# Patient Record
Sex: FEMALE | Race: BLACK OR AFRICAN AMERICAN | NOT HISPANIC OR LATINO | Employment: OTHER | URBAN - METROPOLITAN AREA
[De-identification: names, ages, dates, MRNs, and addresses within clinical notes are randomized per-mention and may not be internally consistent; named-entity substitution may affect disease eponyms.]

---

## 2017-12-06 PROBLEM — F41.9 ANXIETY: Status: ACTIVE | Noted: 2017-12-06

## 2017-12-06 PROBLEM — F32.A DEPRESSION: Status: ACTIVE | Noted: 2017-12-06

## 2017-12-06 PROBLEM — M54.41 CHRONIC BILATERAL LOW BACK PAIN WITH BILATERAL SCIATICA: Status: ACTIVE | Noted: 2017-12-06

## 2017-12-06 PROBLEM — G89.29 CHRONIC BILATERAL LOW BACK PAIN WITH BILATERAL SCIATICA: Status: ACTIVE | Noted: 2017-12-06

## 2017-12-06 PROBLEM — M54.42 CHRONIC BILATERAL LOW BACK PAIN WITH BILATERAL SCIATICA: Status: ACTIVE | Noted: 2017-12-06

## 2017-12-06 PROBLEM — I10 HYPERTENSION: Status: ACTIVE | Noted: 2017-12-06

## 2017-12-06 PROBLEM — F31.9 BIPOLAR DISORDER: Status: ACTIVE | Noted: 2017-12-06

## 2018-05-02 RX ORDER — PAROXETINE HYDROCHLORIDE 20 MG/1
TABLET, FILM COATED ORAL
COMMUNITY
Start: 2018-04-22

## 2018-05-02 RX ORDER — HYDROCHLOROTHIAZIDE 25 MG/1
25 TABLET ORAL DAILY
COMMUNITY
Start: 2018-05-01

## 2018-05-02 RX ORDER — ATORVASTATIN CALCIUM 40 MG/1
40 TABLET, FILM COATED ORAL DAILY
COMMUNITY
Start: 2018-02-27

## 2018-05-02 RX ORDER — QUETIAPINE FUMARATE 200 MG/1
200 TABLET, FILM COATED ORAL NIGHTLY
COMMUNITY
Start: 2018-04-28

## 2018-05-02 RX ORDER — AMLODIPINE BESYLATE 10 MG/1
TABLET ORAL
Status: ON HOLD | COMMUNITY
Start: 2018-03-26 | End: 2023-02-25 | Stop reason: HOSPADM

## 2018-05-02 RX ORDER — VERAPAMIL HYDROCHLORIDE 240 MG/1
240 CAPSULE, EXTENDED RELEASE ORAL DAILY
COMMUNITY
Start: 2018-05-01

## 2018-05-02 RX ORDER — ERGOCALCIFEROL 1.25 MG/1
CAPSULE ORAL
COMMUNITY
Start: 2018-05-01

## 2018-05-02 RX ORDER — CLONAZEPAM 1 MG/1
2 TABLET ORAL 2 TIMES DAILY PRN
COMMUNITY
Start: 2018-05-01

## 2018-05-02 RX ORDER — HYDROCODONE BITARTRATE AND ACETAMINOPHEN 10; 325 MG/1; MG/1
TABLET ORAL
COMMUNITY
Start: 2018-04-15 | End: 2018-08-24

## 2018-05-02 RX ORDER — DIVALPROEX SODIUM 500 MG/1
500 TABLET, DELAYED RELEASE ORAL DAILY
COMMUNITY
Start: 2018-05-01

## 2022-04-11 DIAGNOSIS — Z12.31 ENCOUNTER FOR SCREENING MAMMOGRAM FOR MALIGNANT NEOPLASM OF BREAST: Primary | ICD-10-CM

## 2023-02-24 PROBLEM — R29.90 STROKE-LIKE SYMPTOMS: Status: ACTIVE | Noted: 2023-02-24

## 2023-02-25 PROBLEM — R29.90 STROKE-LIKE SYMPTOMS: Status: RESOLVED | Noted: 2023-02-24 | Resolved: 2023-02-25

## 2023-04-07 PROBLEM — M19.90 OSTEOARTHRITIS: Status: ACTIVE | Noted: 2023-04-07

## 2023-06-14 DIAGNOSIS — M25.562 PAIN IN BOTH KNEES, UNSPECIFIED CHRONICITY: Primary | ICD-10-CM

## 2023-06-14 DIAGNOSIS — M25.561 PAIN IN BOTH KNEES, UNSPECIFIED CHRONICITY: Primary | ICD-10-CM

## 2023-06-19 ENCOUNTER — OFFICE VISIT (OUTPATIENT)
Dept: ORTHOPEDICS | Facility: CLINIC | Age: 63
End: 2023-06-19
Payer: MEDICAID

## 2023-06-19 VITALS
RESPIRATION RATE: 18 BRPM | SYSTOLIC BLOOD PRESSURE: 122 MMHG | HEART RATE: 107 BPM | DIASTOLIC BLOOD PRESSURE: 76 MMHG | BODY MASS INDEX: 31.89 KG/M2 | OXYGEN SATURATION: 99 % | HEIGHT: 63 IN

## 2023-06-19 DIAGNOSIS — M17.0 BILATERAL PRIMARY OSTEOARTHRITIS OF KNEE: ICD-10-CM

## 2023-06-19 DIAGNOSIS — G95.9 MYELOPATHY: Primary | ICD-10-CM

## 2023-06-19 PROCEDURE — 3074F PR MOST RECENT SYSTOLIC BLOOD PRESSURE < 130 MM HG: ICD-10-PCS | Mod: CPTII,,,

## 2023-06-19 PROCEDURE — 20610 DRAIN/INJ JOINT/BURSA W/O US: CPT | Mod: PBBFAC,PN

## 2023-06-19 PROCEDURE — 3078F PR MOST RECENT DIASTOLIC BLOOD PRESSURE < 80 MM HG: ICD-10-PCS | Mod: CPTII,,,

## 2023-06-19 PROCEDURE — 3074F SYST BP LT 130 MM HG: CPT | Mod: CPTII,,,

## 2023-06-19 PROCEDURE — 3008F PR BODY MASS INDEX (BMI) DOCUMENTED: ICD-10-PCS | Mod: CPTII,,,

## 2023-06-19 PROCEDURE — 3078F DIAST BP <80 MM HG: CPT | Mod: CPTII,,,

## 2023-06-19 PROCEDURE — 20610 DRAIN/INJ JOINT/BURSA W/O US: CPT | Mod: S$PBB,50,,

## 2023-06-19 PROCEDURE — 20610 LARGE JOINT ASPIRATION/INJECTION: R KNEE: ICD-10-PCS | Mod: S$PBB,50,,

## 2023-06-19 PROCEDURE — 3044F HG A1C LEVEL LT 7.0%: CPT | Mod: CPTII,,,

## 2023-06-19 PROCEDURE — 99205 OFFICE O/P NEW HI 60 MIN: CPT | Mod: 25,S$PBB,,

## 2023-06-19 PROCEDURE — 99213 OFFICE O/P EST LOW 20 MIN: CPT | Mod: PBBFAC,PN,25

## 2023-06-19 PROCEDURE — 99999 PR PBB SHADOW E&M-EST. PATIENT-LVL III: CPT | Mod: PBBFAC,,,

## 2023-06-19 PROCEDURE — 3008F BODY MASS INDEX DOCD: CPT | Mod: CPTII,,,

## 2023-06-19 PROCEDURE — 99999 PR PBB SHADOW E&M-EST. PATIENT-LVL III: ICD-10-PCS | Mod: PBBFAC,,,

## 2023-06-19 PROCEDURE — 99205 PR OFFICE/OUTPT VISIT, NEW, LEVL V, 60-74 MIN: ICD-10-PCS | Mod: 25,S$PBB,,

## 2023-06-19 PROCEDURE — 3044F PR MOST RECENT HEMOGLOBIN A1C LEVEL <7.0%: ICD-10-PCS | Mod: CPTII,,,

## 2023-06-19 RX ORDER — ATORVASTATIN CALCIUM 20 MG/1
20 TABLET, FILM COATED ORAL
COMMUNITY
Start: 2022-09-06

## 2023-06-19 RX ORDER — AMLODIPINE BESYLATE 10 MG/1
1 TABLET ORAL DAILY
COMMUNITY

## 2023-06-19 RX ORDER — VALACYCLOVIR HYDROCHLORIDE 500 MG/1
500 TABLET, FILM COATED ORAL
COMMUNITY
Start: 2023-04-28

## 2023-06-19 RX ORDER — ROSUVASTATIN CALCIUM 20 MG/1
1 TABLET, COATED ORAL DAILY
COMMUNITY

## 2023-06-19 RX ORDER — TRIAMCINOLONE ACETONIDE 40 MG/ML
40 INJECTION, SUSPENSION INTRA-ARTICULAR; INTRAMUSCULAR
Status: COMPLETED | OUTPATIENT
Start: 2023-06-19 | End: 2023-06-19

## 2023-06-19 RX ORDER — CYCLOBENZAPRINE HCL 10 MG
10 TABLET ORAL 2 TIMES DAILY PRN
COMMUNITY
Start: 2023-04-28 | End: 2023-08-31 | Stop reason: SDUPTHER

## 2023-06-19 RX ADMIN — TRIAMCINOLONE ACETONIDE 40 MG: 40 INJECTION, SUSPENSION INTRA-ARTICULAR; INTRAMUSCULAR at 04:06

## 2023-06-19 RX ADMIN — TRIAMCINOLONE ACETONIDE 40 MG: 40 INJECTION, SUSPENSION INTRA-ARTICULAR; INTRAMUSCULAR at 02:06

## 2023-06-19 NOTE — PROCEDURES
Large Joint Aspiration/Injection: L knee    Date/Time: 6/19/2023 2:30 PM  Performed by: Louisa Marks PA-C  Authorized by: Louisa Marks PA-C     Consent Done?:  Yes (Verbal)  Indications:  Pain and arthritis  Site marked: the procedure site was marked    Timeout: prior to procedure the correct patient, procedure, and site was verified    Prep: patient was prepped and draped in usual sterile fashion    Local anesthetic:  Topical anesthetic and bupivacaine 0.25% without epinephrine  Anesthetic total (ml):  4      Details:  Needle Size:  22 G  Ultrasonic Guidance for needle placement?: No    Approach:  Anterolateral  Location:  Knee  Site:  L knee  Medications:  40 mg Triamcinolone acetonide 40 mg/ml (iaCSI)  Patient tolerance:  Patient tolerated the procedure well with no immediate complications   No

## 2023-06-19 NOTE — PROGRESS NOTES
Patient ID: Ivelisse Chacon is a 63 y.o. female    CC: b/l knee pain      History of Present Illness:    Ivelisse Chacon presents to clinic for  bilateral knee pain .  Patient denies known mechanism of injury.  She reports diffuse lower extremity pain.  She does have a history of stroke as well as several spinal surgeries.  She is unable to extend her knees.  They remain in a flexed position.  She is also concerned about some medications that she was supposed to be on in his not quite sure what she is supposed to take.  Her sister as her caregiver is with her today and also expressing concern about lack of communication as well as follow up care regarding patient's disability.  She has been wheelchair-bound for about the last year and a half following the stroke and is concerned she will never be able to walk again.  She did trial formal physical therapy initially however Medicaid stopped approving.  She is unhappy with her primary care as she feels she had as multiple times for an orthopedic referral.  She does follow up with Neurology at [facility].    Occupation: disabled    Ambulating: wheelchair  Diabetic: +   Lab Results   Component Value Date    HGBA1C 6.0 (H) 02/24/2023       Smoking: no  Hx of DVT/PE: no  Was supposed to be on Plavix but unable to determine if she is taking this medicine.    PAST MEDICAL HISTORY:   Past Medical History:   Diagnosis Date    Anxiety     Arthritis     Depressed     History of seasonal allergies     Hyperlipemia     Hypertension      PAST SURGICAL HISTORY:   Past Surgical History:   Procedure Laterality Date    CHOLECYSTECTOMY      HYSTERECTOMY      LUMBAR SPINE SURGERY      TONSILLECTOMY       FAMILY HISTORY:   Family History   Problem Relation Age of Onset    Heart disease Mother     COPD Mother     COPD Father     Stroke Maternal Grandmother      SOCIAL HISTORY:   Social History     Occupational History    Not on file   Tobacco Use    Smoking status: Former     Packs/day: 1.00      Types: Cigarettes    Smokeless tobacco: Not on file   Substance and Sexual Activity    Alcohol use: No    Drug use: Not Currently    Sexual activity: Not Currently        MEDICATIONS:   Current Outpatient Medications:     atorvastatin (LIPITOR) 20 MG tablet, Take 20 mg by mouth., Disp: , Rfl:     busPIRone (BUSPAR) 5 MG Tab, Take 1 tablet (5 mg total) by mouth 3 (three) times daily., Disp: 90 tablet, Rfl: 0    clonazePAM (KLONOPIN) 1 MG tablet, Take 2 mg by mouth 2 (two) times daily as needed for Anxiety., Disp: , Rfl:     divalproex (DEPAKOTE) 500 MG TbEC, Take 500 mg by mouth Daily., Disp: , Rfl:     empagliflozin (JARDIANCE) 10 mg tablet, Take 10 mg by mouth., Disp: , Rfl:     QUEtiapine (SEROQUEL) 200 MG Tab, 200 mg every evening., Disp: , Rfl:     amLODIPine (NORVASC) 10 MG tablet, Take 1 tablet by mouth once daily., Disp: , Rfl:     atorvastatin (LIPITOR) 40 MG tablet, Take 40 mg by mouth once daily., Disp: , Rfl:     clopidogreL (PLAVIX) 75 mg tablet, Take 1 tablet (75 mg total) by mouth once daily. (Patient not taking: Reported on 6/19/2023), Disp: 30 tablet, Rfl: 11    cyclobenzaprine (FLEXERIL) 10 MG tablet, Take 10 mg by mouth 2 (two) times daily as needed., Disp: , Rfl:     diclofenac sodium (VOLTAREN) 1 % Gel, Apply 2 g topically 4 (four) times daily. (Patient not taking: Reported on 6/19/2023), Disp: 100 g, Rfl: 0    etodolac (LODINE) 200 MG Cap, Take 1 capsule (200 mg total) by mouth 3 (three) times daily. (Patient not taking: Reported on 6/19/2023), Disp: 30 capsule, Rfl: 0    hydroCHLOROthiazide (HYDRODIURIL) 25 MG tablet, Take 25 mg by mouth once daily., Disp: , Rfl:     HYDROcodone-acetaminophen (NORCO) 7.5-325 mg per tablet, Take 1 tablet by mouth every 6 (six) hours as needed for Pain. (Patient not taking: Reported on 6/19/2023), Disp: 16 tablet, Rfl: 0    methylPREDNISolone (MEDROL DOSEPACK) 4 mg tablet, use as directed (Patient not taking: Reported on 6/19/2023), Disp: 1 Package, Rfl: 0     paroxetine (PAXIL) 20 MG tablet, , Disp: , Rfl:     rosuvastatin (CRESTOR) 20 MG tablet, Take 1 tablet by mouth once daily., Disp: , Rfl:     valACYclovir (VALTREX) 500 MG tablet, Take 500 mg by mouth., Disp: , Rfl:     verapamil (VERELAN) 240 MG C24P, Take 240 mg by mouth Daily., Disp: , Rfl:     VITAMIN D2 50,000 unit capsule, , Disp: , Rfl:   No current facility-administered medications for this visit.  ALLERGIES:   Review of patient's allergies indicates:   Allergen Reactions    Aspirin     Ibuprofen Other (See Comments)     N/v/d    Tizanidine Other (See Comments)     dizzy         Physical Exam     Vitals:    06/19/23 1436   BP: 122/76   Pulse: 107   Resp: 18     Alert and oriented to person, place and time. No acute distress. Well-groomed, not ill appearing. Pupils round and reactive, normal respiratory effort, no audible wheezing.     GENERAL:  A well-developed, well-nourished 63 y.o. female who is alert and       oriented in no acute distress.  Tearful throughout exam.     Gait: unable to assess                 EXTREMITIES:  Examination of lower extremities reveals there is no visible mass or deformity.    Bilateral knees remain in flexed position.  She is diffusely tender throughout bilateral lower extremities.  There is diffuse numbness.  There is medial and lateral joint line tenderness.  Unable to extend knee passively or actively.  Range of motion 115 to 80.     Imaging:     Bilateral knee X-rays ordered/reviewed by me showing no evidence of fracture or dislocation. There is no obvious malalignment. No evidence of masses, lesions or foreign bodies.  Moderate to severe bilateral primary osteoarthritis, knees remain in flexed position.    Assessment & Plan    Myelopathy  -     Ambulatory referral/consult to Physical/Occupational Therapy; Future; Expected date: 06/26/2023    Bilateral primary osteoarthritis of knee  -     triamcinolone acetonide injection 40 mg  -     triamcinolone acetonide injection 40  mg  -     Large Joint Aspiration/Injection: L knee  -     Large Joint Aspiration/Injection: R knee       I made the decision to obtain old records of the patient including previous notes and imaging. New imaging was ordered today of the extremity or extremities evaluated. I independently reviewed and interpreted the radiographs and/or MRIs/CT scan today as well as prior imaging.    Patient is a poor historian.  There seems to be a lack of understanding about her medical condition.  Patient unsure of what medicines she was supposed to be taking.  Patient's main concern today is if she will ever walk again. Had a long discussion with patient and caregiver that I am unsure if she will ever be able to walk or extend her legs again, encouraged f/u with neurologist. Discussed with stroke and previous spinal surgeries there may be residual effects.  Discussed we may trial bilateral knee CSI to possibly improve knee pain, but will likely not help with extension and function.  CSIs given.  Post-injection instructions reviewed.    Also placed physical therapy referral.  Patient responsible to establish and continue care.    Discussed importance of communicating with primary care provider as well as neurologist regarding patient's outcomes as well as what medicine she was supposed to be taking.    Follow up: prn  X-rays next visit: none    All questions were answered and patient is agreeable to the above plan.     The total time spent for evaluation and management on 06/20/2023 including reviewing separately obtained history, performing a medically appropriate exam and evaluation, documenting clinical information in the health record, independently interpreting results and communicating them to the patient/family/caregiver, and ordering medications/tests/procedures was between 60+ minutes.

## 2023-06-19 NOTE — PROCEDURES
Large Joint Aspiration/Injection: R knee    Date/Time: 6/19/2023 2:30 PM  Performed by: Louisa Marks PA-C  Authorized by: Louisa Marks PA-C     Consent Done?:  Yes (Verbal)  Indications:  Pain and arthritis  Site marked: the procedure site was marked    Timeout: prior to procedure the correct patient, procedure, and site was verified    Prep: patient was prepped and draped in usual sterile fashion    Local anesthetic:  Topical anesthetic and bupivacaine 0.25% without epinephrine  Anesthetic total (ml):  4      Details:  Needle Size:  22 G  Ultrasonic Guidance for needle placement?: No    Approach:  Anterolateral  Location:  Knee  Site:  R knee  Medications:  40 mg Triamcinolone acetonide 40 mg/ml (iaCSI)  Patient tolerance:  Patient tolerated the procedure well with no immediate complications

## 2023-08-31 ENCOUNTER — OFFICE VISIT (OUTPATIENT)
Dept: ORTHOPEDICS | Facility: CLINIC | Age: 63
End: 2023-08-31
Payer: MEDICAID

## 2023-08-31 ENCOUNTER — TELEPHONE (OUTPATIENT)
Dept: ORTHOPEDICS | Facility: CLINIC | Age: 63
End: 2023-08-31
Payer: MEDICAID

## 2023-08-31 VITALS
SYSTOLIC BLOOD PRESSURE: 147 MMHG | HEART RATE: 109 BPM | WEIGHT: 171.94 LBS | HEIGHT: 62 IN | BODY MASS INDEX: 31.64 KG/M2 | DIASTOLIC BLOOD PRESSURE: 83 MMHG

## 2023-08-31 DIAGNOSIS — M17.0 BILATERAL PRIMARY OSTEOARTHRITIS OF KNEE: Primary | ICD-10-CM

## 2023-08-31 PROCEDURE — 3008F BODY MASS INDEX DOCD: CPT | Mod: CPTII,,,

## 2023-08-31 PROCEDURE — 3079F DIAST BP 80-89 MM HG: CPT | Mod: CPTII,,,

## 2023-08-31 PROCEDURE — 3077F SYST BP >= 140 MM HG: CPT | Mod: CPTII,,,

## 2023-08-31 PROCEDURE — 1159F MED LIST DOCD IN RCRD: CPT | Mod: CPTII,,,

## 2023-08-31 PROCEDURE — 3077F PR MOST RECENT SYSTOLIC BLOOD PRESSURE >= 140 MM HG: ICD-10-PCS | Mod: CPTII,,,

## 2023-08-31 PROCEDURE — 99213 PR OFFICE/OUTPT VISIT, EST, LEVL III, 20-29 MIN: ICD-10-PCS | Mod: S$PBB,25,,

## 2023-08-31 PROCEDURE — 20610 DRAIN/INJ JOINT/BURSA W/O US: CPT | Mod: PBBFAC,PN

## 2023-08-31 PROCEDURE — 99213 OFFICE O/P EST LOW 20 MIN: CPT | Mod: S$PBB,25,,

## 2023-08-31 PROCEDURE — 99999PBSHW PR PBB SHADOW TECHNICAL ONLY FILED TO HB: ICD-10-PCS | Mod: PBBFAC,,,

## 2023-08-31 PROCEDURE — 3008F PR BODY MASS INDEX (BMI) DOCUMENTED: ICD-10-PCS | Mod: CPTII,,,

## 2023-08-31 PROCEDURE — 3079F PR MOST RECENT DIASTOLIC BLOOD PRESSURE 80-89 MM HG: ICD-10-PCS | Mod: CPTII,,,

## 2023-08-31 PROCEDURE — 20610 LARGE JOINT ASPIRATION/INJECTION: L KNEE: ICD-10-PCS | Mod: S$PBB,50,,

## 2023-08-31 PROCEDURE — 99999 PR PBB SHADOW E&M-EST. PATIENT-LVL III: ICD-10-PCS | Mod: PBBFAC,,,

## 2023-08-31 PROCEDURE — 99999PBSHW PR PBB SHADOW TECHNICAL ONLY FILED TO HB: Mod: PBBFAC,,,

## 2023-08-31 PROCEDURE — 3044F PR MOST RECENT HEMOGLOBIN A1C LEVEL <7.0%: ICD-10-PCS | Mod: CPTII,,,

## 2023-08-31 PROCEDURE — 99999 PR PBB SHADOW E&M-EST. PATIENT-LVL III: CPT | Mod: PBBFAC,,,

## 2023-08-31 PROCEDURE — 20610 DRAIN/INJ JOINT/BURSA W/O US: CPT | Mod: S$PBB,50,,

## 2023-08-31 PROCEDURE — 3044F HG A1C LEVEL LT 7.0%: CPT | Mod: CPTII,,,

## 2023-08-31 PROCEDURE — 1159F PR MEDICATION LIST DOCUMENTED IN MEDICAL RECORD: ICD-10-PCS | Mod: CPTII,,,

## 2023-08-31 PROCEDURE — 99213 OFFICE O/P EST LOW 20 MIN: CPT | Mod: PBBFAC,PN,25

## 2023-08-31 RX ORDER — DAPAGLIFLOZIN 10 MG/1
10 TABLET, FILM COATED ORAL
COMMUNITY
Start: 2023-07-12

## 2023-08-31 RX ORDER — CYCLOBENZAPRINE HCL 10 MG
10 TABLET ORAL 2 TIMES DAILY PRN
Qty: 90 TABLET | Refills: 1 | Status: SHIPPED | OUTPATIENT
Start: 2023-08-31

## 2023-08-31 RX ORDER — TRIAMCINOLONE ACETONIDE 40 MG/ML
40 INJECTION, SUSPENSION INTRA-ARTICULAR; INTRAMUSCULAR
Status: COMPLETED | OUTPATIENT
Start: 2023-08-31 | End: 2023-08-31

## 2023-08-31 RX ORDER — METRONIDAZOLE 500 MG/1
500 TABLET ORAL 2 TIMES DAILY
COMMUNITY
Start: 2023-07-12

## 2023-08-31 RX ADMIN — TRIAMCINOLONE ACETONIDE 40 MG: 40 INJECTION, SUSPENSION INTRA-ARTICULAR; INTRAMUSCULAR at 09:08

## 2023-08-31 NOTE — PROCEDURES
Large Joint Aspiration/Injection: R knee    Date/Time: 8/31/2023 9:00 AM    Performed by: Louisa Marks PA-C  Authorized by: Louisa Marks PA-C    Consent Done?:  Yes (Verbal)  Indications:  Pain and arthritis  Site marked: the procedure site was marked    Timeout: prior to procedure the correct patient, procedure, and site was verified    Prep: patient was prepped and draped in usual sterile fashion    Local anesthetic:  Topical anesthetic and bupivacaine 0.25% without epinephrine  Anesthetic total (ml):  4      Details:  Needle Size:  22 G  Ultrasonic Guidance for needle placement?: No    Approach:  Anterolateral  Location:  Knee  Site:  R knee  Medications:  40 mg Triamcinolone acetonide 40 mg/ml (iaCSI)  Patient tolerance:  Patient tolerated the procedure well with no immediate complications

## 2023-08-31 NOTE — PROGRESS NOTES
Patient ID: Ivelisse Chacon is a 63 y.o. female    CC: b/l knee pain      History of Present Illness:    Ivelisse Chacon presents to clinic for  bilateral knee pain .  Patient denies known mechanism of injury.  She reports diffuse lower extremity pain.  She does have a history of stroke as well as several spinal surgeries.  She is unable to extend her knees.  They remain in a flexed position.  She is also concerned about some medications that she was supposed to be on in his not quite sure what she is supposed to take.  Her sister as her caregiver is with her today and also expressing concern about lack of communication as well as follow up care regarding patient's disability.  She has been wheelchair-bound for about the last year and a half following the stroke and is concerned she will never be able to walk again.  She did trial formal physical therapy initially however Medicaid stopped approving.  She is unhappy with her primary care as she feels she had as multiple times for an orthopedic referral.  She does follow up with Neurology at [facility].    Occupation: disabled    Ambulating: wheelchair  Diabetic: +   Lab Results   Component Value Date    HGBA1C 6.0 (H) 02/24/2023       Smoking: no  Hx of DVT/PE: no  Was supposed to be on Plavix but unable to determine if she is taking this medicine.    ____________________________________________________________________    Interval history 8/31/2023: Patient returns today for follow up of bilateral knee pain. She has experienced multiple falls since previous visit on 6/19/2023. She follows up with neurosurgery and had an appt a week ago who is restarting pt in PT and if no improvement will proceed with lumbar MRI. Patient is requesting b/l knee CSI today.          PAST MEDICAL HISTORY:   Past Medical History:   Diagnosis Date    Anxiety     Arthritis     Depressed     History of seasonal allergies     Hyperlipemia     Hypertension      PAST SURGICAL HISTORY:   Past Surgical  History:   Procedure Laterality Date    CHOLECYSTECTOMY      HYSTERECTOMY      LUMBAR SPINE SURGERY      TONSILLECTOMY       FAMILY HISTORY:   Family History   Problem Relation Age of Onset    Heart disease Mother     COPD Mother     COPD Father     Stroke Maternal Grandmother      SOCIAL HISTORY:   Social History     Occupational History    Not on file   Tobacco Use    Smoking status: Former     Current packs/day: 1.00     Types: Cigarettes    Smokeless tobacco: Not on file   Substance and Sexual Activity    Alcohol use: No    Drug use: Not Currently    Sexual activity: Not Currently        MEDICATIONS:   Current Outpatient Medications:     amLODIPine (NORVASC) 10 MG tablet, Take 1 tablet by mouth once daily., Disp: , Rfl:     atorvastatin (LIPITOR) 20 MG tablet, Take 20 mg by mouth., Disp: , Rfl:     atorvastatin (LIPITOR) 40 MG tablet, Take 40 mg by mouth once daily., Disp: , Rfl:     baclofen (LIORESAL) 20 MG tablet, Take 1 tablet (20 mg total) by mouth 3 (three) times daily., Disp: 20 tablet, Rfl: 0    busPIRone (BUSPAR) 5 MG Tab, Take 1 tablet (5 mg total) by mouth 3 (three) times daily., Disp: 90 tablet, Rfl: 0    clonazePAM (KLONOPIN) 1 MG tablet, Take 2 mg by mouth 2 (two) times daily as needed for Anxiety., Disp: , Rfl:     diclofenac sodium (VOLTAREN) 1 % Gel, Apply 2 g topically 4 (four) times daily., Disp: 100 g, Rfl: 0    divalproex (DEPAKOTE) 500 MG TbEC, Take 500 mg by mouth Daily., Disp: , Rfl:     empagliflozin (JARDIANCE) 10 mg tablet, Take 10 mg by mouth., Disp: , Rfl:     FARXIGA 10 mg tablet, Take 10 mg by mouth., Disp: , Rfl:     hydroCHLOROthiazide (HYDRODIURIL) 25 MG tablet, Take 25 mg by mouth once daily., Disp: , Rfl:     HYDROcodone-acetaminophen (NORCO) 7.5-325 mg per tablet, Take 1 tablet by mouth every 6 (six) hours as needed for Pain., Disp: 12 tablet, Rfl: 0    metroNIDAZOLE (FLAGYL) 500 MG tablet, Take 500 mg by mouth 2 (two) times daily., Disp: , Rfl:     paroxetine (PAXIL) 20 MG  tablet, , Disp: , Rfl:     QUEtiapine (SEROQUEL) 200 MG Tab, 200 mg every evening., Disp: , Rfl:     rosuvastatin (CRESTOR) 20 MG tablet, Take 1 tablet by mouth once daily., Disp: , Rfl:     valACYclovir (VALTREX) 500 MG tablet, Take 500 mg by mouth., Disp: , Rfl:     verapamil (VERELAN) 240 MG C24P, Take 240 mg by mouth Daily., Disp: , Rfl:     VITAMIN D2 50,000 unit capsule, , Disp: , Rfl:     cyclobenzaprine (FLEXERIL) 10 MG tablet, Take 1 tablet (10 mg total) by mouth 2 (two) times daily as needed for Muscle spasms., Disp: 90 tablet, Rfl: 1  No current facility-administered medications for this visit.  ALLERGIES:   Review of patient's allergies indicates:   Allergen Reactions    Acetaminophen     Aspirin     Ibuprofen Other (See Comments)     N/v/d    Tizanidine Other (See Comments)     dizzy         Physical Exam     Vitals:    08/31/23 0917   BP: (!) 147/83   Pulse: 109       Alert and oriented to person, place and time. No acute distress. Well-groomed, not ill appearing. Pupils round and reactive, normal respiratory effort, no audible wheezing.     GENERAL:  A well-developed, well-nourished 63 y.o. female who is alert and       oriented in no acute distress.  Tearful throughout exam.     Gait: unable to assess                 EXTREMITIES:  Examination of lower extremities reveals there is no visible mass or deformity.    Bilateral knees remain in flexed position.  She is diffusely tender throughout bilateral lower extremities.  There is diffuse numbness.  There is medial and lateral joint line tenderness.  Unable to extend knee passively or actively.  Range of motion 115 to 80.     Imaging:     Bilateral knee X-rays ordered/reviewed by me showing no evidence of fracture or dislocation. There is no obvious malalignment. No evidence of masses, lesions or foreign bodies.  Moderate to severe bilateral primary osteoarthritis, knees remain in flexed position.    Assessment & Plan    Bilateral primary osteoarthritis  of knee  -     cyclobenzaprine (FLEXERIL) 10 MG tablet; Take 1 tablet (10 mg total) by mouth 2 (two) times daily as needed for Muscle spasms.  Dispense: 90 tablet; Refill: 1  -     triamcinolone acetonide injection 40 mg  -     triamcinolone acetonide injection 40 mg  -     Large Joint Aspiration/Injection: L knee  -     Large Joint Aspiration/Injection: R knee      Discussed with patient that we generally repeat CSIs every 3 months.  Patient got quite frustrated and felt today with a waist up an appointment as she thought she was getting injections.  Discussed I will give injections early at this time but in the future it has to be at least 3 months.  Bilateral knee CSI given.  Post-injection instructions reviewed.  Also discussed that I do not think a knee replacement would help her walk and she has more neurological deficits that are preventing her from walking, not due to her arthritis.  Patient is going to continue in physical therapy and have follow up with Neurosurgery.    Follow up: 3 months, cannot provide CSIs sooner than 3 months  X-rays next visit: none    All questions were answered and patient is agreeable to the above plan.

## 2023-08-31 NOTE — TELEPHONE ENCOUNTER
----- Message from Darcie Lopez sent at 8/31/2023  8:18 AM CDT -----  Regarding: appt / LATE; waiting on transporation services  Contact: PT @ [phone]  Pt is calling to advise the office of running late for their appt today. Pt is still asking to be seen. Please call to advise. Thanks.     Provider: ASIYA Marks    Appt time: 9:00am    ETA: pt is unsure; she may arrive right at 9am. She is currently still waiting on transportation services to pick her up. Pt is asking to please not cancel her appt. Thanks.

## 2023-08-31 NOTE — PROCEDURES
Large Joint Aspiration/Injection: L knee    Date/Time: 8/31/2023 9:00 AM    Performed by: Louisa Marks PA-C  Authorized by: Louisa Marks PA-C    Consent Done?:  Yes (Verbal)  Indications:  Pain and arthritis  Site marked: the procedure site was marked    Timeout: prior to procedure the correct patient, procedure, and site was verified    Prep: patient was prepped and draped in usual sterile fashion    Local anesthetic:  Topical anesthetic and bupivacaine 0.25% without epinephrine  Anesthetic total (ml):  4      Details:  Needle Size:  22 G  Ultrasonic Guidance for needle placement?: No    Approach:  Anterolateral  Location:  Knee  Site:  L knee  Medications:  40 mg Triamcinolone acetonide 40 mg/ml (iaCSI)  Patient tolerance:  Patient tolerated the procedure well with no immediate complications

## 2023-10-16 DIAGNOSIS — Z12.31 ENCOUNTER FOR SCREENING MAMMOGRAM FOR MALIGNANT NEOPLASM OF BREAST: Primary | ICD-10-CM
